# Patient Record
Sex: MALE | Race: WHITE | Employment: FULL TIME | URBAN - METROPOLITAN AREA
[De-identification: names, ages, dates, MRNs, and addresses within clinical notes are randomized per-mention and may not be internally consistent; named-entity substitution may affect disease eponyms.]

---

## 2017-01-13 ENCOUNTER — OFFICE VISIT (OUTPATIENT)
Dept: FAMILY MEDICINE | Facility: CLINIC | Age: 32
End: 2017-01-13
Payer: COMMERCIAL

## 2017-01-13 ENCOUNTER — OFFICE VISIT (OUTPATIENT)
Dept: BEHAVIORAL HEALTH | Facility: CLINIC | Age: 32
End: 2017-01-13
Payer: COMMERCIAL

## 2017-01-13 ENCOUNTER — OFFICE VISIT (OUTPATIENT)
Dept: BEHAVIORAL HEALTH | Facility: CLINIC | Age: 32
End: 2017-01-13

## 2017-01-13 VITALS
BODY MASS INDEX: 21.38 KG/M2 | HEART RATE: 65 BPM | DIASTOLIC BLOOD PRESSURE: 72 MMHG | SYSTOLIC BLOOD PRESSURE: 121 MMHG | WEIGHT: 149 LBS | OXYGEN SATURATION: 100 %

## 2017-01-13 DIAGNOSIS — F19.21: ICD-10-CM

## 2017-01-13 DIAGNOSIS — F43.22 ADJUSTMENT DISORDER WITH ANXIOUS MOOD: ICD-10-CM

## 2017-01-13 DIAGNOSIS — F43.22 ADJUSTMENT DISORDER WITH ANXIOUS MOOD: Primary | ICD-10-CM

## 2017-01-13 DIAGNOSIS — G47.00 INSOMNIA, UNSPECIFIED TYPE: Primary | ICD-10-CM

## 2017-01-13 DIAGNOSIS — R69 DIAGNOSIS DEFERRED: Primary | ICD-10-CM

## 2017-01-13 PROCEDURE — 99213 OFFICE O/P EST LOW 20 MIN: CPT | Performed by: PHYSICIAN ASSISTANT

## 2017-01-13 PROCEDURE — 99207 ZZC NO CHARGE BEHAVIORAL WARM HANDOFF: CPT | Performed by: MARRIAGE & FAMILY THERAPIST

## 2017-01-13 RX ORDER — QUETIAPINE FUMARATE 50 MG/1
50 TABLET, FILM COATED ORAL 2 TIMES DAILY
Qty: 60 TABLET | Refills: 3 | Status: SHIPPED | OUTPATIENT
Start: 2017-01-13 | End: 2020-09-03

## 2017-01-13 NOTE — NURSING NOTE
"Chief Complaint   Patient presents with     Sleep Problem       Initial /72 mmHg  Pulse 65  Wt 149 lb (67.586 kg)  SpO2 100% Estimated body mass index is 21.38 kg/(m^2) as calculated from the following:    Height as of 5/20/16: 5' 10\" (1.778 m).    Weight as of this encounter: 149 lb (67.586 kg).  BP completed using cuff size: aquiles Fong CMA      "

## 2017-01-13 NOTE — PROGRESS NOTES
Behavioral Health Home Services   formerly [facility]: [facility]    Social Work Care Navigator Note    Patient: Kev Álvarez  Date: January 13, 2017  Preferred Name: Kev    Previous PHQ-9:   PHQ-9 SCORE 5/20/2016 6/9/2016   Total Score 2 2     Previous LISA-7:   LISA-7 SCORE 5/20/2016 6/9/2016   Total Score 3 1     KEEAGN LEVEL:  KEEGAN Score (Last Two) 4/9/2015   KEEGAN Raw Score 52   Activation Score 100   KEEGAN Level 4       Preferred Contact:    Type of Contact: Face to Face in Clinic    Data: (subjective / Objective):    formerly [facility] Introduction:  Hi my name is Percy eHnaodudley from your [facility] primary care clinic.     I work closely with your primary care provider, Rolando Chavarria.     If it's ok I'd like to talk about some new services available to you, at no out of pocket cost to you.      Before we get started can you verify your insurance for me? Pt verified insurance     What social work or case management services do you receive? (If so, are you receiving ACT or TCM?).  N/A    Getting to Know You - Whole Person Care:  This new service is called Behavioral Health Home services, which is designed to support you as a whole person beyond just your medical needs.      Tell me about what types of things that are causing you stress OR impacting your quality of life?    Finances    Sleep    Daily Stress    Legal Issues    I'm here to be a central point of contact for your healthcare needs and to help with:    Housing    Transportation    Financial resources    Comprehensive Health needs (appointment help, medication costs, etc.)    Employment    Education    Health Insurance applications    And connecting with social supports or community resources    Out of the things I mentioned what would you find helpful?      Pt appeared interested in service, however wanted to talk to [facility] about it- he stated he felt more comfortable with her. Writer provided patient with enrollment forms and he stated he would bring back with next [facility] visit if he  "was interested in service. Welcomed calls from pt should he have any further questions/concerns.     To get started:   If patient has a Diagnostic Assessment -   You can stop in and meet with me in the clinic or we can schedule an appointment right now.      When you come into the clinic there will be a few forms for you to fill out in the lobby.    We'll work together on a brief assessment to better understand how we can help and then put together a plan to meet your needs.    If patient does not have a Diagnostic Assessment -   We'll schedule you for an appointment with (Name of [facility]) to do an assessment and then I'll meet with you briefly afterwards to help you get enrolled.    When you come into the clinic there will be a few forms for you to fill out in the lobby.    Patient response to [facility] Service offering:   Considering enrolling in [facility] services    Percy Young, Social Work Care Coordinator   ________________________________________________________________  Administrative- Social Work Staff Info:     Update the [facility] Brief Needs Assessment Flowsheet \"enrollment status\"     \"declined\"    \"considering\" enrolling in [facility] services.      \"Interested and will enroll\"    \"waitlisted\"    Update enrollment status to \"enrolled\" only when they have come into clinic and completed the paper consent and brief needs assessment.    Verify that patient has had Diagnostic Assessment within the past 12 months and if not schedule an appointment with the [facility] to complete.            Next 5 appointments (look out 90 days)     Jan 17, 2017 11:00 AM   Return Visit with Heena Gonzalez   [facility] ([facility])    [address] [facility] [address] [phone]                "

## 2017-01-13 NOTE — PROGRESS NOTES
Warm-handoff    Patient reports continued legal issues, and ongoing anxiety symptoms.   Patient scheduled Therapy/[facility] appointment 1/17/17 and is interested in [facility] program.

## 2017-01-13 NOTE — PROGRESS NOTES
SUBJECTIVE:                                                    Kev Álvarez is a 31 year old male who presents to clinic today for the following health issues:    Insomnia      Duration:  3 weeks    Description  Frequency of insomnia:  nightly  Time to fall asleep: gets to bed quickly  Middle of night awakening:  Yes almost nightly - has dreams and then feels like he cant sleep from then on  Early morning awakening:  several times a week    Accompanying signs and symptoms:  None known     History  Similar episodes in past:  YES  Previous evaluation/sleep study:  no     Precipitating or alleviating factors:  New stressful situation: YES  Caffeine intake after lunchtime: has quit recently   OTC decongestants: no   Any new medications: no     Therapies tried and outcome: seroquel   Was recently in a tx program for ETOH and marijauna. Been 60 days sober.   Tx: melatonin 6mg didn't helped.   Was on Seroquel in 8/16 and thinks it helped. Wants to try it again.  Care Everywhere Reviewed    Problem list and histories reviewed & adjusted, as indicated.  Additional history: as documented    Patient Active Problem List   Diagnosis     CARDIOVASCULAR SCREENING; LDL GOAL LESS THAN 160     Elevated blood pressure reading without diagnosis of hypertension     Common wart     Anxiety     Adjustment disorder with anxious mood     Hx of drug dependence (H)     Health Care Home     Past Surgical History   Procedure Laterality Date     Ent surgery         Social History   Substance Use Topics     Smoking status: Current Every Day Smoker     Types: Cigarettes     Smokeless tobacco: Never Used     Alcohol Use: Yes      Comment: soc     History reviewed. No pertinent family history.      Current Outpatient Prescriptions   Medication Sig Dispense Refill     QUEtiapine (SEROQUEL) 50 MG tablet Take 1 tablet (50 mg) by mouth 2 times daily 60 tablet 3     [DISCONTINUED] QUEtiapine (SEROQUEL) 50 MG tablet Take 50 mg by mouth 2 times daily        Allergies   Allergen Reactions     Amoxicillin      Hives     Problem list, Medication list, Allergies, and Medical/Social/Surgical histories reviewed in EPIC and updated as appropriate.    ROS:  Constitutional, HEENT, cardiovascular, pulmonary, gi and gu systems are negative, except as otherwise noted.    OBJECTIVE:                                                    /72 mmHg  Pulse 65  Wt 149 lb (67.586 kg)  SpO2 100%  Body mass index is 21.38 kg/(m^2).  GENERAL: healthy, alert and no distress  PSYCH: mentation appears normal, affect normal/bright    Diagnostic Test Results:  none      ASSESSMENT/PLAN:                                                        ICD-10-CM    1. Insomnia, unspecified type G47.00 QUEtiapine (SEROQUEL) 50 MG tablet   2. Hx of drug dependence (H) F19.21    3. Adjustment disorder with anxious mood F43.22 QUEtiapine (SEROQUEL) 50 MG tablet   ok for refill.  warning signs discussed.  side effects discussed  con't outpt tx for drug use.  Warm clinic hand off to Heena ([facility])  Recheck 4 wks.     Rolando Chavarria PA-C  [facility]

## 2017-01-13 NOTE — MR AVS SNAPSHOT
After Visit Summary   1/13/2017    Kev Álvarez    MRN: [id number]           Patient Information     Date Of Birth          1985        Visit Information        Provider Department      1/13/2017 11:20 AM Rolando Chavarria PA-C [facility]        Today's Diagnoses     Insomnia, unspecified type    -  1     Hx of drug dependence (H)         Adjustment disorder with anxious mood            Follow-ups after your visit        Who to contact     If you have questions or need follow up information about today's clinic visit or your schedule please contact [facility] directly at [phone].  Normal or non-critical lab and imaging results will be communicated to you by Equ[employer]onhart, letter or phone within 4 business days after the clinic has received the results. If you do not hear from us within 7 days, please contact the clinic through [employer] or phone. If you have a critical or abnormal lab result, we will notify you by phone as soon as possible.  Submit refill requests through [employer] or call your pharmacy and they will forward the refill request to us. Please allow 3 business days for your refill to be completed.          Additional Information About Your Visit        MyChart Information     [employer] gives you secure access to your electronic health record. If you see a primary care provider, you can also send messages to your care team and make appointments. If you have questions, please call your primary care clinic.  If you do not have a primary care provider, please call [phone] and they will assist you.        Care EveryWhere ID     This is your Care EveryWhere ID. This could be used by other organizations to access your [place] medical records  [id number]        Your Vitals Were     Pulse Pulse Oximetry                65 100%           Blood Pressure from Last 3 Encounters:   01/13/17 121/72   08/25/16 138/84   07/13/16 122/82    Weight from Last 3 Encounters:    01/13/17 149 lb (67.586 kg)   08/25/16 144 lb 12.8 oz (65.681 kg)   07/13/16 141 lb (63.957 kg)              Today, you had the following     No orders found for display         Where to get your medicines      These medications were sent to [place], [place] - [address] [address], [address]  [address] [address], [facility] [address], [facility] [address]     Phone:  [phone]    - QUEtiapine 50 MG tablet       Primary Care Provider Office Phone # Fax #    Rolando Chavarria PA-C [phone]       [facility] [address] [facility] [address]        Thank you!     Thank you for choosing [facility]  for your care. Our goal is always to provide you with excellent care. Hearing back from our patients is one way we can continue to improve our services. Please take a few minutes to complete the written survey that you may receive in the mail after your visit with us. Thank you!             Your Updated Medication List - Protect others around you: Learn how to safely use, store and throw away your medicines at www.disposemymeds.org.          This list is accurate as of: 1/13/17 11:35 AM.  Always use your most recent med list.                   Brand Name Dispense Instructions for use    QUEtiapine 50 MG tablet    SEROquel    60 tablet    Take 1 tablet (50 mg) by mouth 2 times daily

## 2017-01-17 ENCOUNTER — OFFICE VISIT (OUTPATIENT)
Dept: BEHAVIORAL HEALTH | Facility: CLINIC | Age: 32
End: 2017-01-17
Payer: COMMERCIAL

## 2017-01-17 DIAGNOSIS — F41.1 GAD (GENERALIZED ANXIETY DISORDER): Primary | ICD-10-CM

## 2017-01-17 DIAGNOSIS — F19.21: ICD-10-CM

## 2017-01-17 DIAGNOSIS — R69 DIAGNOSIS DEFERRED: Primary | ICD-10-CM

## 2017-01-17 PROCEDURE — 90834 PSYTX W PT 45 MINUTES: CPT | Performed by: MARRIAGE & FAMILY THERAPIST

## 2017-01-17 ASSESSMENT — ANXIETY QUESTIONNAIRES
4. TROUBLE RELAXING: NOT AT ALL
1. FEELING NERVOUS, ANXIOUS, OR ON EDGE: NOT AT ALL
7. FEELING AFRAID AS IF SOMETHING AWFUL MIGHT HAPPEN: NOT AT ALL
2. NOT BEING ABLE TO STOP OR CONTROL WORRYING: SEVERAL DAYS
6. BECOMING EASILY ANNOYED OR IRRITABLE: NOT AT ALL
GAD7 TOTAL SCORE: 2
5. BEING SO RESTLESS THAT IT IS HARD TO SIT STILL: NOT AT ALL
3. WORRYING TOO MUCH ABOUT DIFFERENT THINGS: SEVERAL DAYS

## 2017-01-17 NOTE — PROGRESS NOTES
Behavioral Health Home Services   [facility]: [facility]    Social Work Care Navigator Note    Patient: Kev Álvarez  Date: January 17, 2017  Preferred Name: Kev    Previous PHQ-9:   PHQ-9 SCORE 5/20/2016 6/9/2016   Total Score 2 2     Previous LISA-7:   LISA-7 SCORE 5/20/2016 6/9/2016   Total Score 3 1     KEEGAN LEVEL:  KEEGAN Score (Last Two) 4/9/2015   KEEGAN Raw Score 52   Activation Score 100   KEEGAN Level 4       Preferred Contact:    Type of Contact: Face to Face in Clinic    Data: (subjective / Objective):    Patient came in to complete the comprehensive wellness assessment for Behavioral Health Home Services.  See [facility] Flowsheets for details on the assessment.  See [facility], [facility] for a copy of the patient's care plan.    Percy Young, Social Work Care Coordinator        Next 5 appointments (look out 90 days)     Jan 24, 2017  3:30 PM   [facility]   [facility]    [address] [facility] [id number] [phone]

## 2017-01-17 NOTE — Clinical Note
Patient is enrolled in Behavioral Health Home services. Together we have created a care plan that patient intends to follow with the support of the team.     The initial goal areas we established include:  Health, Mental Health, Chemical Health & Employment.    PCP: I will notify you via your preferred communication method if there is something specific that patient needs in order for [facility] to be successful. Otherwise; feel free  to glance at this care plan at your convience.     Please let me know if there are any changes OR additional goals that you would like to have added to Care Plan prior to patient receiving a copy.       Thank you!     Percy Young, SHANTA  [facility]

## 2017-01-17 NOTE — Clinical Note
Behavioral Health Home ([facility]): Health Action Plan  [facility] Clinic: [place]  [facility]    Name: Kev Álvarez  Preferred Name: Kev  : 1985  MRN: [id number]    How to Contact me  Need for : No  Preferred Contact: Email (consent completed)    Home Phone [phone]   Mobile [phone]     Ok to contact me by email?* Yes: (Pt signed consent)    *Signed & Scanned Consent form Required*   [email or web address]     Name and contact of oconnor supports:  (Kandis Engle (grandmother) [phone]); (Naima Contreras (mother) [phone])     My Goals  Goal Areas: Health, Mental Health, Chemical Health, Employment / Volunteer    Patient stated goals:     Health: Pt states he wants to find a dentist & quit smoking.     Mental Health: Pt states he wants to receive education and understand his diagnosis    Chemical Health: Pt states he wants to complete his current treatment successfully.     Employment: Pt states he wants to find a job that he enjoys and support himself.     Strengths related to each goal: Pt reports his stregths are: hard worker, consistent with appts, attentivness, ability to learn new things, follow through.     Services and Supports Needed: Pt states he needs help finding employment, and needs community resources from care team in relation to goal areas.     Activities / Actions of Team to support goal(s): The care team with provide the patient with necesary resources/referrals in all goal areas stated in order for the patient to be able to work towards the accomplishment of stated set goals. This includes employment and dental resources. Care team will continue to follow up with patient on monthly basis to review goal plans and check on progress of set goal areas.     Activities / Actions of Patient / Parent / Guardian to support goal(s):     Health: Patient states he will go to the dentist, try the resource/referral in order to quit smoking and continue to see his PCP for other health  concerns such as most recently troubles with sleep.       Mental Health: Patient states he will attend his sessions with [facility] and follow up with any referrals or recommendations from [facility] or care team in regards to his mental health.       Chemical Health: Pt states he will continue with his outpatient, chemical abuse program at [facility], has approx 9 weeks left.       Employment: Pt states he will take the resources that care team provides, apply for jobs, search online, update his resume and seek help if needed.     Recommended Referral  Tobacco cessation referrals made?: Yes (see comments) (Will make referral )  Mental Health / Chemical Dependency Referrals: No     Mental Health Referrals: None (Pt already seeing a [facility])       My Team Members and Their Contact Information  Patient Care Team       Relationship Specialty Notifications Start End    Rolando Chavarria PA-C PCP - General Family Practice  8/24/16     Phone: [phone] Fax: [phone]         [facility] [address] [facility] [address]    Percy Young   Admissions 1/17/17     Comment:  Phone: [phone]  Fax: [phone]/    Heena Gonzalez Behavioral Health Clinician Marriage & Family Therapist Admissions 1/17/17     Phone: [phone] Fax: [phone]         [facility] [address] [facility] [address]    Nerissa [employer] Sai Other (see comments) COUNSELOR - PROFESSIONAL Admissions 1/17/17     Comment:  [facility]     Phone: [phone]         [facility] [address] [place] [address]          My Wellness Plan  Safety Concerns: None Reported / Observed     Recommendations / Plan for safety concerns: Patient has no safety concerns at this time. Pt is aware to contact emergency services and/or family/friends if any safety concerns were to arise.    Crisis Plan (emergencies / when urgent support needed): Patient has no safety concerns  at this time. Pt is aware to contact emergency services and/or family/friends if any safety concerns were to arise.    Kev Álvarez co-developed the Health Action Plan with the [facility] Team and received a copy of this document.  Date Health Action Plan Completed/Updated: 02/08/2017

## 2017-01-17 NOTE — Clinical Note
Behavioral Health Home ([facility]): Health Action Plan  [facility] Clinic: [place]  [facility]    Name: Kev Álvarez  Preferred Name: Kev  : 1985  MRN: [id number]    How to Contact me  Need for : No  Preferred Contact: Email (consent completed)    Home Phone [phone]   Mobile [phone]     Ok to contact me by email?* Yes: (Pt signed consent)    *Signed & Scanned Consent form Required*   [email or web address]     Name and contact of oconnor supports:  (Kandis Engle (grandmother) [phone]); (Naima Contreras (mother) [phone])     My Goals  Goal Areas: Health, Mental Health, Chemical Health, Employment / Volunteer    Patient stated goals:     Health: Pt states he wants to find a dentist & quit smoking.     Mental Health: Pt states he wants to receive education and understand his diagnosis    Chemical Health: Pt states he wants to complete his current treatment successfully.     Employment: Pt states he wants to find a job that he enjoys and support himself.     Strengths related to each goal: Pt reports his stregths are: hard worker, consistent with appts, attentivness, ability to learn new things, follow through.     Services and Supports Needed: Pt states he needs help finding employment, and needs community resources from care team in relation to goal areas.     Activities / Actions of Team to support goal(s): The care team with provide the patient with necesary resources/referrals in all goal areas stated in order for the patient to be able to work towards the accomplishment of stated set goals. This includes employment and dental resources. Care team will continue to follow up with patient on monthly basis to review goal plans and check on progress of set goal areas.     Activities / Actions of Patient / Parent / Guardian to support goal(s):     Health: Patient states he will go to the dentist, try the resource/referral in order to quit smoking and continue to see his PCP for other health  concerns such as most recently troubles with sleep.       Mental Health: Patient states he will attend his sessions with [facility] and follow up with any referrals or recommendations from [facility] or care team in regards to his mental health.       Chemical Health: Pt states he will continue with his outpatient, chemical abuse program at [facility], has approx 9 weeks left.       Employment: Pt states he will take the resources that care team provides, apply for jobs, search online, update his resume and seek help if needed.     Recommended Referral  Tobacco cessation referrals made?: Yes (see comments) (Will make referral )  Mental Health / Chemical Dependency Referrals: No     Mental Health Referrals: None (Pt already seeing a [facility])       My Team Members and Their Contact Information  Patient Care Team       Relationship Specialty Notifications Start End    Rolando Chavarria PA-C PCP - General Family Practice  8/24/16     Phone: [phone] Fax: [phone]         [facility] [address] [facility] [address]    Percy Young   Admissions 1/17/17     Comment:  Phone: [phone]  Fax: [phone]/    Heena Gonzalez Behavioral Health Clinician Marriage & Family Therapist Admissions 1/17/17     Phone: [phone] Fax: [phone]         [facility] [address] [facility] [address]    Nerissa [employer] Sai Other (see comments) COUNSELOR - PROFESSIONAL Admissions 1/17/17     Comment:  [facility]     Phone: [phone]         [address] [address]          My Wellness Plan  Safety Concerns: None Reported / Observed     Recommendations / Plan for safety concerns: Pt reports he would call 911 for any safety concerns OR contact family members  Crisis Plan (emergencies / when urgent support needed): Pt reports he would call 911 for any safety concerns or crisis OR contact his family members.        Kev Álvarez co-developed the Health Action Plan with the [facility] Team and received a copy of this document.  Date Health Action Plan Completed/Updated: 1/19/2017

## 2017-01-18 ASSESSMENT — ANXIETY QUESTIONNAIRES: GAD7 TOTAL SCORE: 2

## 2017-01-18 ASSESSMENT — PATIENT HEALTH QUESTIONNAIRE - PHQ9: SUM OF ALL RESPONSES TO PHQ QUESTIONS 1-9: 12

## 2017-01-24 ENCOUNTER — OFFICE VISIT (OUTPATIENT)
Dept: BEHAVIORAL HEALTH | Facility: CLINIC | Age: 32
End: 2017-01-24
Payer: COMMERCIAL

## 2017-01-24 DIAGNOSIS — F41.1 GAD (GENERALIZED ANXIETY DISORDER): Primary | ICD-10-CM

## 2017-01-24 DIAGNOSIS — F19.21: ICD-10-CM

## 2017-01-24 PROCEDURE — 90834 PSYTX W PT 45 MINUTES: CPT | Performed by: MARRIAGE & FAMILY THERAPIST

## 2017-01-30 PROBLEM — F41.1 GAD (GENERALIZED ANXIETY DISORDER): Status: ACTIVE | Noted: 2017-01-30

## 2017-01-30 NOTE — PROGRESS NOTES
[facility] [place]      Behavioral Health Clinician Progress Note    Patient Name: Kev Álvarez           Service Type: Individual      Service Location:  in clinic      Session Start Time: 11:00  Session End Time: 11:50      Session Length: 38 - 52      Attendees: Patient    Visit Activities (Refresh list every visit): [facility] Only    Diagnostic Assessment Date: 6/9/16  Treatment Plan Review Date: To be completed at 3rd session  See Flowsheets for today's PHQ-9 and LISA-7 results  Previous PHQ-9:   PHQ-9 SCORE 5/20/2016 6/9/2016 1/17/2017   Total Score 2 2 12     Previous LISA-7:   LISA-7 SCORE 5/20/2016 6/9/2016 1/17/2017   Total Score 3 1 2       KEEGAN LEVEL:  KEEGAN Score (Last Two) 4/9/2015 1/17/2017   KEEGAN Raw Score 52 36   Activation Score 100 75.5   KEEGAN Level 4 4       DATA  Extended Session (60+ minutes): No  Interactive Complexity: Yes, visit entailed Interactive Complexity evidenced by:  -The need to manage maladaptive communication (related to, e.g., high anxiety, high reactivity, repeated questions, or disagreement) among participants that complicates delivery of care  Crisis: No    Treatment Objective(s) Addressed in This Session:  Target Behavior(s): disease management/lifestyle changes reated to adjustment with anxious moods    Adjustment Difficulties: will develop coping/problem-solving skills to facilitate more adaptive adjustment    Current Stressors / Issues:  Patient reports he has moved in with his grandmother 77yr and has moved out of mom's home. Patient reports he has sentencing hearing February 10, 2017. Patient reports he has done some work for snow removal but is not consistent. Patient reports he entered Chemical Dependency treatment at [facility] in [place], [place]. Patient reports he completed out-patient program and is now seeing Chemical Dependency counselor 1 time per week for 10 weeks, attending AA meetings and is sober for 61 days. Patient reports he  continues to have sleep problems and mood changes. Patient reports he wants to focus on his sobriety and doing the things he sued to like to do (motorcycle, disc golf). Patient reports he has been on medication Seroquel, and was diagnosed with Bipolar Disorder when he was hospitalized.         Progress on Treatment Objective(s) / Homework:  New Objective established this session - PREPARATION (Decided to change - considering how); Intervened by negotiating a change plan and determining options / strategies for behavior change, identifying triggers, exploring social supports, and working towards setting a date to begin behavior change    Also provided psychoeducation about behavioral health condition, symptoms, and treatment options    Completed Mood Disorder Questionnaire and patient screened positive for mood disorder.     Care Plan review completed: Yes    Medication Review:  No changes to current psychiatric medication(s) Requesting refill of Seroquel      Medication Compliance:  Yes    Changes in Health Issues:   None reported    Chemical Use Review:   Substance Use: Concerns for patient's Chemical Dependency use; family members have recently accused him of using meth. Patient will complete Chemical Dependency evaluation      Tobacco Use: No current tobacco use.      Assessment: Current Emotional / Mental Status (status of significant symptoms):  Risk status (Self / Other harm or suicidal ideation)  Patient denies a history of suicidal ideation, suicide attempts, self-injurious behavior, homicidal ideation, homicidal behavior and and other safety concerns  Patient denies current fears or concerns for personal safety.  Patient denies current or recent suicidal ideation or behaviors.  Patient denies current or recent homicidal ideation or behaviors.  Patient denies current or recent self injurious behavior or ideation.  Patient denies other safety concerns.  A safety and risk management plan has not been developed  at this time, however patient was encouraged to call [facility] should there be a change in any of these risk factors.    Appearance:   Appropriate   Eye Contact:   Good   Psychomotor Behavior: Restless   Attitude:   Guarded   Orientation:   All  Speech   Rate / Production: Hyperverbal    Volume:  Normal   Mood:    Angry  Anxious  Irritable   Affect:    Worrisome   Thought Content:  Rumination   Thought Form:  Flight of Ideas  Logical  Tangential   Insight:    Fair     Diagnoses:  1. LISA (generalized anxiety disorder)    2. Hx of drug dependence (H)        Collateral Reports Completed:  Not Applicable    Plan: (Homework, other):  Patient was given information about behavioral services and encouraged to schedule a follow up appointment with the clinic [facility] as needed, Patient reports he does not plan to return to [facility].  He was also given information about mental health symptoms and treatment options .  CD Recommendations: Complete a Rule 25 Assessment.  CHACORTA Harper, [facility]

## 2017-01-31 PROCEDURE — S0280 MEDICAL HOME, INITIAL PLAN: HCPCS | Mod: U5 | Performed by: MARRIAGE & FAMILY THERAPIST

## 2017-02-06 NOTE — PROGRESS NOTES
[facility] [place]   January 24, 2017      Behavioral Health Clinician Progress Note    Patient Name: Kev Álvarez           Service Type: Individual      Service Location:  in clinic      Session Start Time: 3:30  Session End Time: 4:15      Session Length: 38 - 52      Attendees: Patient    Visit Activities (Refresh list every visit): [facility] Only    Diagnostic Assessment Date: 6/9/16  Treatment Plan Review Date: To be completed at 3rd session  See Flowsheets for today's PHQ-9 and LISA-7 results  Previous PHQ-9:   PHQ-9 SCORE 5/20/2016 6/9/2016 1/17/2017   Total Score 2 2 12     Previous LISA-7:   LISA-7 SCORE 5/20/2016 6/9/2016 1/17/2017   Total Score 3 1 2       KEEGAN LEVEL:  KEEGAN Score (Last Two) 4/9/2015 1/17/2017   KEEGAN Raw Score 52 36   Activation Score 100 75.5   KEEGAN Level 4 4       DATA  Extended Session (60+ minutes): No  Interactive Complexity: Yes, visit entailed Interactive Complexity evidenced by:  -The need to manage maladaptive communication (related to, e.g., high anxiety, high reactivity, repeated questions, or disagreement) among participants that complicates delivery of care  Crisis: No    Treatment Objective(s) Addressed in This Session:  Target Behavior(s): disease management/lifestyle changes reated to adjustment with anxious moods    Adjustment Difficulties: will develop coping/problem-solving skills to facilitate more adaptive adjustment    Current Stressors / Issues:  Patient processed behavior activation chart from last week. Patient processed his engagement in looking for work, attending AA meetings, spending time with niece/family and playing guitar.       Patient reports he has moved in with his grandmother 77yr and has moved out of mom's home. Patient reports he has sentencing hearing February 10, 2017. Patient reports he has done some work for snow removal but is not consistent. Patient reports he entered Chemical Dependency treatment at [facility] in [place], [place]. Patient  reports he completed out-patient program and is now seeing Chemical Dependency counselor 1 time per week for 10 weeks, attending AA meetings and is sober for 61 days. Patient reports he continues to have sleep problems and mood changes. Patient reports he wants to focus on his sobriety and doing the things he sued to like to do (motorcycle, disc golf). Patient reports he has been on medication Seroquel, and was diagnosed with Bipolar Disorder when he was hospitalized.         Progress on Treatment Objective(s) / Homework:  New Objective established this session - PREPARATION (Decided to change - considering how); Intervened by negotiating a change plan and determining options / strategies for behavior change, identifying triggers, exploring social supports, and working towards setting a date to begin behavior change    Also provided psychoeducation about behavioral health condition, symptoms, and treatment options    Completed Mood Disorder Questionnaire and patient screened positive for mood disorder.     Care Plan review completed: Yes    Medication Review:  No changes to current psychiatric medication(s) Requesting refill of Seroquel      Medication Compliance:  Yes    Changes in Health Issues:   None reported    Chemical Use Review:   Substance Use: Concerns for patient's Chemical Dependency use; family members have recently accused him of using meth. Patient will complete Chemical Dependency evaluation      Tobacco Use: No current tobacco use.      Assessment: Current Emotional / Mental Status (status of significant symptoms):  Risk status (Self / Other harm or suicidal ideation)  Patient denies a history of suicidal ideation, suicide attempts, self-injurious behavior, homicidal ideation, homicidal behavior and and other safety concerns  Patient denies current fears or concerns for personal safety.  Patient denies current or recent suicidal ideation or behaviors.  Patient denies current or recent homicidal  ideation or behaviors.  Patient denies current or recent self injurious behavior or ideation.  Patient denies other safety concerns.  A safety and risk management plan has not been developed at this time, however patient was encouraged to call [place] [phone] should there be a change in any of these risk factors.    Appearance:   Appropriate   Eye Contact:   Good   Psychomotor Behavior: Restless   Attitude:   Guarded   Orientation:   All  Speech   Rate / Production: Hyperverbal    Volume:  Normal   Mood:    Angry  Anxious  Irritable   Affect:    Worrisome   Thought Content:  Rumination   Thought Form:  Flight of Ideas  Logical  Tangential   Insight:    Fair     Diagnoses:  1. LISA (generalized anxiety disorder)    2. Hx of drug dependence (H)        Collateral Reports Completed:  Not Applicable    Plan: (Homework, other):  Patient was given information about behavioral services and encouraged to schedule a follow up appointment with the clinic [facility] as needed, Patient reports he does not plan to return to [facility].  He was also given information about mental health symptoms and treatment options .  CD Recommendations: Complete a Rule 25 Assessment.  CHACORTA Harper, [facility]

## 2017-02-20 ENCOUNTER — TELEPHONE (OUTPATIENT)
Dept: BEHAVIORAL HEALTH | Facility: CLINIC | Age: 32
End: 2017-02-20

## 2017-02-20 NOTE — TELEPHONE ENCOUNTER
Behavioral Health Home Services  [facility]: [facility]     Social Work Care Navigator Note    Patient: Kev Álvarez  Date: February 20, 2017  Preferred Name: Kev    Previous PHQ-9:   PHQ-9 SCORE 5/20/2016 6/9/2016 1/17/2017   Total Score 2 2 12     Previous LISA-7:   LISA-7 SCORE 5/20/2016 6/9/2016 1/17/2017   Total Score 3 1 2     KEEGAN LEVEL:  KEEGAN Score (Last Two) 4/9/2015 1/17/2017   KEEGAN Raw Score 52 36   Activation Score 100 75.5   KEEGAN Level 4 4       Preferred Contact: @[email or web address]([id number])@  Type of Contact: Phone call (not reached/unavailable)    Data: (subjective / Objective):    Writer attempted to reach patient to schedule his [facility] and [facility] follow up visits in clinic, but was unsuccessful. Unable to leave message as mailbox is not set up yet per phone message. Plan to attempt again.      Percy Yonug, Social Work Care Coordinator         Next 5 appointments (look out 90 days)     Mar 06, 2017 11:00 AM CST   Return Visit with Percy Young   [facility]    [id number] [facility] [id number] [phone]

## 2017-02-28 ENCOUNTER — TELEPHONE (OUTPATIENT)
Dept: BEHAVIORAL HEALTH | Facility: CLINIC | Age: 32
End: 2017-02-28

## 2017-02-28 NOTE — TELEPHONE ENCOUNTER
Behavioral Health Home Services  [facility]: [place]    Social Work Care Navigator Note    Patient: Kev Álvarez  Date: February 28, 2017  Preferred Name: Kev    Previous PHQ-9:   PHQ-9 SCORE 5/20/2016 6/9/2016 1/17/2017   Total Score 2 2 12     Previous LISA-7:   LISA-7 SCORE 5/20/2016 6/9/2016 1/17/2017   Total Score 3 1 2     KEEGAN LEVEL:  KEEGAN Score (Last Two) 4/9/2015 1/17/2017   KEEGAN Raw Score 52 36   Activation Score 100 75.5   KEEGAN Level 4 4       Preferred Contact: @[email or web address]([id number])@  Type of Contact: Phone call (not reached/unavailable)    Data: (subjective / Objective):    Attempted to reach patient, but was unsuccessful.  Writer called patient to do monthly check in and scheduled next clinic appointments with [facility]. However unable to leave message as mailbox is not set up yet & patient did not answer. Plan to attempt again.    Percy Young Social Work Care Coordinator         Next 5 appointments (look out 90 days)     Mar 06, 2017 11:00 AM CST   [facility]    [address] [facility] [id number] [phone]

## 2017-03-14 ENCOUNTER — TELEPHONE (OUTPATIENT)
Dept: BEHAVIORAL HEALTH | Facility: CLINIC | Age: 32
End: 2017-03-14

## 2017-03-14 NOTE — TELEPHONE ENCOUNTER
Behavioral Health Home Services  @[email or web address]([id number])@    Social Work Care Navigator Note    Patient: Kev Álvarez  Date: March 14, 2017  Preferred Name: Kev    Previous PHQ-9:   PHQ-9 SCORE 5/20/2016 6/9/2016 1/17/2017   Total Score 2 2 12     Previous LISA-7:   LISA-7 SCORE 5/20/2016 6/9/2016 1/17/2017   Total Score 3 1 2     KEEGAN LEVEL:  KEEGAN Score (Last Two) 4/9/2015 1/17/2017   KEEGAN Raw Score 52 36   Activation Score 100 75.5   KEEGAN Level 4 4       Preferred Contact: @[email or web address]([id number])@  Type of Contact: Phone call (not reached/unavailable)    Data: (subjective / Objective):    Attempted to reach patient, but was unsuccessful.  Writer called patient to see about scheduling clinic appt with [facility] and [facility]. Patient's phone went straight to voice mail and unable to leave message as mailbox was not set up yet.   Plan to attempt again.    JASEN Ryan

## 2017-04-14 ENCOUNTER — TELEPHONE (OUTPATIENT)
Dept: BEHAVIORAL HEALTH | Facility: CLINIC | Age: 32
End: 2017-04-14

## 2017-04-14 NOTE — TELEPHONE ENCOUNTER
Behavioral Health Home Services  @[email or web address]([phone])@      Social Work Care Navigator Note      Patient: Kev Álvarez  Date: April 14, 2017  Preferred Name: Kev    Previous PHQ-9:   PHQ-9 SCORE 5/20/2016 6/9/2016 1/17/2017   Total Score 2 2 12     Previous LISA-7:   LISA-7 SCORE 5/20/2016 6/9/2016 1/17/2017   Total Score 3 1 2     KEEGAN LEVEL:  KEEGAN Score (Last Two) 4/9/2015 1/17/2017   KEEGAN Raw Score 52 36   Activation Score 100 75.5   KEEGAN Level 4 4       Preferred Contact: @[email or web address]([id number])@  Type of Contact: Phone call (not reached/unavailable)    Data: (subjective / Objective):  Attempted to reach patient, but was unsuccessful. Writer called patient to discuss scheduling clinic appt with [facility] and [facility] & do monthly [facility] follow up. Writer unable to leave message as mailbox was not set up yet. Plan to attempt again.      JASEN Ryan

## 2017-05-26 ENCOUNTER — TELEPHONE (OUTPATIENT)
Dept: BEHAVIORAL HEALTH | Facility: CLINIC | Age: 32
End: 2017-05-26

## 2017-05-26 NOTE — TELEPHONE ENCOUNTER
Behavioral Health Home Services  [facility]: [facility] Work Care Navigator Note    Patient: Kev Álvarez  Date: May 26, 2017  Preferred Name: Kev    Previous PHQ-9:   PHQ-9 SCORE 5/20/2016 6/9/2016 1/17/2017   Total Score 2 2 12     Previous LISA-7:   LISA-7 SCORE 5/20/2016 6/9/2016 1/17/2017   Total Score 3 1 2     KEEGAN LEVEL:  KEEGAN Score (Last Two) 4/9/2015 1/17/2017   EKEGAN Raw Score 52 36   Activation Score 100 75.5   KEEGAN Level 4 4       Preferred Contact:  Need for : No  Preferred Contact: Email (consent completed)    Type of Contact Today: Phone call (not reached/unavailable)    Data: (subjective / Objective):    Attempted to reach patient, but was unsuccessful. Writer attempted to call patient in regards to setting up OV and doing phone outreach. Pt does not have VM set up yet and did not answer.  Plan to attempt again.      JASEN Ryan

## 2017-09-21 ENCOUNTER — CARE COORDINATION (OUTPATIENT)
Dept: BEHAVIORAL HEALTH | Facility: CLINIC | Age: 32
End: 2017-09-21

## 2020-02-24 ENCOUNTER — HEALTH MAINTENANCE LETTER (OUTPATIENT)
Age: 35
End: 2020-02-24

## 2020-08-26 ENCOUNTER — OFFICE VISIT (OUTPATIENT)
Dept: FAMILY MEDICINE | Facility: CLINIC | Age: 35
End: 2020-08-26
Payer: COMMERCIAL

## 2020-08-26 ENCOUNTER — TRANSFERRED RECORDS (OUTPATIENT)
Dept: HEALTH INFORMATION MANAGEMENT | Facility: CLINIC | Age: 35
End: 2020-08-26

## 2020-08-26 VITALS
DIASTOLIC BLOOD PRESSURE: 71 MMHG | BODY MASS INDEX: 20.5 KG/M2 | SYSTOLIC BLOOD PRESSURE: 122 MMHG | HEART RATE: 79 BPM | TEMPERATURE: 98.5 F | WEIGHT: 138.4 LBS | OXYGEN SATURATION: 98 % | RESPIRATION RATE: 16 BRPM | HEIGHT: 69 IN

## 2020-08-26 DIAGNOSIS — N50.811 RIGHT TESTICULAR PAIN: Primary | ICD-10-CM

## 2020-08-26 PROCEDURE — 99203 OFFICE O/P NEW LOW 30 MIN: CPT | Performed by: PHYSICIAN ASSISTANT

## 2020-08-26 ASSESSMENT — MIFFLIN-ST. JEOR: SCORE: 1556.54

## 2020-08-26 NOTE — PROGRESS NOTES
"Subjective     Kev Álvarez is a 34 year old male who presents to clinic today for the following health issues:    HPI       Concern - Testicle pain  Onset: 3 days ago woke up with aching pain in right testicle.  Description: worse in the morning, \"it's there\"  Intensity: 7/10  Progression of Symptoms:  same and constant  Accompanying Signs & Symptoms: no dysuria, urgency, frequency or hematuria.  Previous history of similar problem: no  Precipitating factors:        Worsened by: bending over  Alleviating factors:        Improved by: no  Therapies tried and outcome: Advil; gets better    No issues with urinating or pooping. Has not been sexually active for \"quite some time\". No concerns for STI. No blood in urine or semen. No other significant past medical history or family history. No trauma.    Review of Systems   Constitutional, gi and gu systems are negative, except as otherwise noted.      Objective    /71   Pulse 79   Temp 98.5  F (36.9  C) (Tympanic)   Resp 16   Ht 1.742 m (5' 8.58\")   Wt 62.8 kg (138 lb 6.4 oz)   SpO2 98%   BMI 20.69 kg/m    Body mass index is 20.69 kg/m .  Physical Exam  Vitals signs and nursing note reviewed.   Constitutional:       General: He is not in acute distress.     Appearance: He is not ill-appearing or diaphoretic.   HENT:      Head: Normocephalic and atraumatic.      Mouth/Throat:      Mouth: Mucous membranes are moist.   Eyes:      Conjunctiva/sclera: Conjunctivae normal.   Cardiovascular:      Rate and Rhythm: Normal rate and regular rhythm.      Heart sounds: Normal heart sounds. No murmur. No friction rub. No gallop.    Pulmonary:      Effort: Pulmonary effort is normal. No respiratory distress.      Breath sounds: Normal breath sounds. No stridor. No wheezing, rhonchi or rales.   Abdominal:      General: Bowel sounds are normal. There is no distension.      Palpations: Abdomen is soft. There is no mass.      Tenderness: There is no abdominal tenderness. There is " no guarding or rebound.      Hernia: No hernia is present.      Comments: No CVAT.   Genitourinary:     Comments: Circumcised. No discharge or blood at meatus. No inguinal adenopathy or hernia. Right testicle riding slightly higher than left. Right testicle tender with no masses or abnormality in size. Left testicle and bilateral spermatic cords non-tender.   Skin:     General: Skin is warm and dry.   Neurological:      General: No focal deficit present.      Mental Status: He is alert. Mental status is at baseline.   Psychiatric:         Mood and Affect: Mood normal.         Behavior: Behavior normal.          Assessment & Plan     Kev was seen today for testicular/scrotal pain.    Diagnoses and all orders for this visit:    Right testicular pain    Impression is right testicular pain of unknown etiology. Less likely referred from abdomen. Favor orchitis versus epididymitis, however I cannot rule out testicular torsion.  Unfortunately, we could not get an ultrasound performed in clinic today and I feel he should be evaluated in the emergency department with imaging today.  He agreed with this plan and will drive himself there.  I spoke with Barbie, the charge nurse at [facility] emergency department who will expect his arrival.     See Patient Instructions    Return if symptoms worsen or fail to improve.    SONG Crooks  [facility]

## 2020-08-28 ENCOUNTER — TELEPHONE (OUTPATIENT)
Dept: FAMILY MEDICINE | Facility: CLINIC | Age: 35
End: 2020-08-28

## 2020-08-28 NOTE — TELEPHONE ENCOUNTER
Ethan, Are you able to see this patient for a Hospital Follow up visit or does he needs to go to his PCP?     Loree Camilo RN BSN

## 2020-08-28 NOTE — TELEPHONE ENCOUNTER
Reason for call: New Appointment     INF-Ultrasound on Scrotum with exploratory surgery-8/26-8/27/2020-[facility] days. Patient would like to follow up with SONG Fuchs     Best Time:  any    Can we leave a detailed message on this number?  YES

## 2020-08-29 NOTE — TELEPHONE ENCOUNTER
I'd be happy to see him, though I am working virtually next week and he may benefit more from a face to face eval.    SONG Crooks

## 2020-09-03 ENCOUNTER — OFFICE VISIT (OUTPATIENT)
Dept: FAMILY MEDICINE | Facility: CLINIC | Age: 35
End: 2020-09-03
Payer: COMMERCIAL

## 2020-09-03 VITALS
OXYGEN SATURATION: 97 % | RESPIRATION RATE: 16 BRPM | BODY MASS INDEX: 20.66 KG/M2 | SYSTOLIC BLOOD PRESSURE: 112 MMHG | WEIGHT: 138.2 LBS | DIASTOLIC BLOOD PRESSURE: 66 MMHG | TEMPERATURE: 98.5 F | HEART RATE: 66 BPM

## 2020-09-03 DIAGNOSIS — N50.811 RIGHT TESTICULAR PAIN: Primary | ICD-10-CM

## 2020-09-03 DIAGNOSIS — Q55.29: ICD-10-CM

## 2020-09-03 PROCEDURE — 99214 OFFICE O/P EST MOD 30 MIN: CPT | Performed by: PHYSICIAN ASSISTANT

## 2020-09-03 RX ORDER — DOXYCYCLINE HYCLATE 100 MG
100 TABLET ORAL
COMMUNITY
Start: 2020-08-27 | End: 2020-09-05

## 2020-09-03 RX ORDER — OXYCODONE AND ACETAMINOPHEN 5; 325 MG/1; MG/1
1 TABLET ORAL
COMMUNITY
Start: 2020-08-27

## 2020-10-09 DIAGNOSIS — N45.2 ACUTE ORCHITIS: ICD-10-CM

## 2020-10-09 DIAGNOSIS — N45.2 ACUTE ORCHITIS: Primary | ICD-10-CM

## 2020-10-09 LAB
ALBUMIN UR-MCNC: NEGATIVE MG/DL
APPEARANCE UR: CLEAR
BILIRUB UR QL STRIP: NEGATIVE
COLOR UR AUTO: YELLOW
GLUCOSE UR STRIP-MCNC: NEGATIVE MG/DL
HGB UR QL STRIP: NEGATIVE
KETONES UR STRIP-MCNC: NEGATIVE MG/DL
LEUKOCYTE ESTERASE UR QL STRIP: NEGATIVE
NITRATE UR QL: NEGATIVE
PH UR STRIP: 7 PH (ref 5–7)
SOURCE: NORMAL
SP GR UR STRIP: 1.01 (ref 1–1.03)
UROBILINOGEN UR STRIP-ACNC: 0.2 EU/DL (ref 0.2–1)

## 2020-10-09 PROCEDURE — 87086 URINE CULTURE/COLONY COUNT: CPT | Performed by: UROLOGY

## 2020-10-09 PROCEDURE — 81003 URINALYSIS AUTO W/O SCOPE: CPT | Performed by: UROLOGY

## 2020-10-10 LAB
BACTERIA SPEC CULT: NO GROWTH
Lab: NORMAL
SPECIMEN SOURCE: NORMAL

## 2020-12-13 ENCOUNTER — HEALTH MAINTENANCE LETTER (OUTPATIENT)
Age: 35
End: 2020-12-13

## 2021-04-17 ENCOUNTER — HEALTH MAINTENANCE LETTER (OUTPATIENT)
Age: 36
End: 2021-04-17

## 2021-09-26 ENCOUNTER — HEALTH MAINTENANCE LETTER (OUTPATIENT)
Age: 36
End: 2021-09-26

## 2022-05-08 ENCOUNTER — HEALTH MAINTENANCE LETTER (OUTPATIENT)
Age: 37
End: 2022-05-08

## 2023-04-23 ENCOUNTER — HEALTH MAINTENANCE LETTER (OUTPATIENT)
Age: 38
End: 2023-04-23

## 2023-06-02 ENCOUNTER — HEALTH MAINTENANCE LETTER (OUTPATIENT)
Age: 38
End: 2023-06-02

## 2024-09-20 NOTE — PROGRESS NOTES
Subjective     Kev Álvarez is a 34 year old male who presents to clinic today for the following health issues:    HPI         Hospital Follow-up Visit:    Hospital/Nursing Home/IP Rehab Facility: [facility]  Date of Admission: 8/26/20  Date of Discharge: 8/27/20  Reason(s) for Admission: R Testicle Pain    Kev Álvarez was a 34-year-old male with a past medical history of anxiety was admitted for testicular pain. Testicular ultrasound suggested no blood flow to the right epididymis and decreased blood flow to the left epididymis. He underwent scrotal exploration and excision of appendix testis on 8/26.    Was your hospitalization related to COVID-19? No   Problems taking medications regularly:  None  Medication changes since discharge: Started Doxycycline and Oxycodone  Problems adhering to non-medication therapy:  None    Summary of hospitalization:  CareEverywhere information obtained and reviewed  Diagnostic Tests/Treatments reviewed.  Follow up needed: none  Other Healthcare Providers Involved in Patient s Care:         urology  Update since discharge: improved. Post Discharge Medication Reconciliation: discharge medications reconciled and changed, per note/orders.  Plan of care communicated with patient          Doing well post operative. No irritative/obst voiding symptoms. Pain is minimal. Incision healing well.  No bowel issues. No cough. No fevers.     Review of Systems   Constitutional, HEENT, cardiovascular, pulmonary, GI, , musculoskeletal, neuro, skin, endocrine and psych systems are negative, except as otherwise noted.      Objective    /66   Pulse 66   Temp 98.5  F (36.9  C) (Tympanic)   Resp 16   Wt 62.7 kg (138 lb 3.2 oz)   SpO2 97%   BMI 20.66 kg/m    Body mass index is 20.66 kg/m .  Physical Exam     Eye exam - right eye normal lid, conjunctiva, cornea, pupil and fundus, left eye normal lid, conjunctiva, cornea, pupil and fundus.  CHEST:chest clear to IPPA, no tachypnea,  retractions or cyanosis and S1, S2 normal, no murmur, no gallop, rate regular  The abdomen is soft without tenderness, guarding, mass, rebound or organomegaly. Bowel sounds are normal. No CVA tenderness or inguinal adenopathy noted.  Incision healing well. No signs of secondary infection.  Right hydrocele appreciated. No profound testicular pain on exam today..    Kev was seen today for hospital f/u.    Diagnoses and all orders for this visit:    Right testicular pain    Appendix testes      Doing well post operative.  Advised supportive and symptomatic treatment.  Follow up with Provider - if condition persists or worsens.   See urology next week for his scheduled f/u.     Click here